# Patient Record
Sex: FEMALE | Race: WHITE | Employment: UNEMPLOYED | URBAN - NONMETROPOLITAN AREA
[De-identification: names, ages, dates, MRNs, and addresses within clinical notes are randomized per-mention and may not be internally consistent; named-entity substitution may affect disease eponyms.]

---

## 2017-11-20 ENCOUNTER — OFFICE VISIT (OUTPATIENT)
Dept: ONCOLOGY | Age: 62
End: 2017-11-20
Payer: COMMERCIAL

## 2017-11-20 ENCOUNTER — HOSPITAL ENCOUNTER (OUTPATIENT)
Dept: INFUSION THERAPY | Age: 62
Discharge: HOME | End: 2017-11-20
Payer: COMMERCIAL

## 2017-11-20 VITALS
DIASTOLIC BLOOD PRESSURE: 67 MMHG | SYSTOLIC BLOOD PRESSURE: 135 MMHG | HEIGHT: 64 IN | BODY MASS INDEX: 50.02 KG/M2 | TEMPERATURE: 97.4 F | RESPIRATION RATE: 18 BRPM | HEART RATE: 82 BPM | OXYGEN SATURATION: 97 % | WEIGHT: 293 LBS

## 2017-11-20 DIAGNOSIS — D72.823 LEUKEMOID REACTION: Primary | ICD-10-CM

## 2017-11-20 DIAGNOSIS — D72.823 LEUKEMOID REACTION: ICD-10-CM

## 2017-11-20 LAB
ALBUMIN SERPL-MCNC: 3.7 G/DL (ref 3.5–5.1)
ALP BLD-CCNC: 291 U/L (ref 38–126)
ALT SERPL-CCNC: 42 U/L (ref 11–66)
ANISOCYTOSIS: ABNORMAL
AST SERPL-CCNC: 39 U/L (ref 5–40)
BASOPHILS # BLD: 0.7 %
BASOPHILS ABSOLUTE: 0.1 THOU/MM3 (ref 0–0.1)
BILIRUB SERPL-MCNC: 0.7 MG/DL (ref 0.3–1.2)
BILIRUBIN DIRECT: < 0.2 MG/DL (ref 0–0.3)
BUN, WHOLE BLOOD: 25 MG/DL (ref 8–26)
CHLORIDE, WHOLE BLOOD: 99 MEQ/L (ref 98–109)
CREATININE, WHOLE BLOOD: 1 MG/DL (ref 0.5–1.2)
EOSINOPHIL # BLD: 3 %
EOSINOPHILS ABSOLUTE: 0.4 THOU/MM3 (ref 0–0.4)
GFR, ESTIMATED ,CON: 60 ML/MIN/1.73M2
GLUCOSE, WHOLE BLOOD: 181 MG/DL (ref 70–108)
HCT VFR BLD CALC: 47.4 % (ref 37–47)
HEMOGLOBIN: 14.8 GM/DL (ref 12–16)
IONIZED CALCIUM, WHOLE BLOOD: 1.29 MMOL/L (ref 1.12–1.32)
LYMPHOCYTES # BLD: 27 %
LYMPHOCYTES ABSOLUTE: 3.8 THOU/MM3 (ref 1–4.8)
MACROCYTES: ABNORMAL
MCH RBC QN AUTO: 31.8 PG (ref 27–31)
MCHC RBC AUTO-ENTMCNC: 31.3 GM/DL (ref 33–37)
MCV RBC AUTO: 102 FL (ref 81–99)
MONOCYTES # BLD: 6.3 %
MONOCYTES ABSOLUTE: 0.9 THOU/MM3 (ref 0.4–1.3)
NUCLEATED RED BLOOD CELLS: 0 /100 WBC
PDW BLD-RTO: 11.4 % (ref 11.5–14.5)
PLATELET # BLD: 378 THOU/MM3 (ref 130–400)
PLATELET ESTIMATE: ADEQUATE
PMV BLD AUTO: 9.2 MCM (ref 7.4–10.4)
POTASSIUM, WHOLE BLOOD: 3.9 MEQ/L (ref 3.5–4.9)
RBC # BLD: 4.67 MILL/MM3 (ref 4.2–5.4)
SCAN OF BLOOD SMEAR: NORMAL
SEG NEUTROPHILS: 63 %
SEGMENTED NEUTROPHILS ABSOLUTE COUNT: 8.9 THOU/MM3 (ref 1.8–7.7)
SODIUM, WHOLE BLOOD: 139 MEQ/L (ref 138–146)
TOTAL CO2, WHOLE BLOOD: 27 MEQ/L (ref 23–33)
TOTAL PROTEIN: 8.5 G/DL (ref 6.1–8)
WBC # BLD: 14.1 THOU/MM3 (ref 4.8–10.8)

## 2017-11-20 PROCEDURE — 3017F COLORECTAL CA SCREEN DOC REV: CPT | Performed by: INTERNAL MEDICINE

## 2017-11-20 PROCEDURE — 84165 PROTEIN E-PHORESIS SERUM: CPT

## 2017-11-20 PROCEDURE — 80076 HEPATIC FUNCTION PANEL: CPT

## 2017-11-20 PROCEDURE — 82784 ASSAY IGA/IGD/IGG/IGM EACH: CPT

## 2017-11-20 PROCEDURE — 88184 FLOWCYTOMETRY/ TC 1 MARKER: CPT

## 2017-11-20 PROCEDURE — 84160 ASSAY OF PROTEIN ANY SOURCE: CPT

## 2017-11-20 PROCEDURE — G8417 CALC BMI ABV UP PARAM F/U: HCPCS | Performed by: INTERNAL MEDICINE

## 2017-11-20 PROCEDURE — 3014F SCREEN MAMMO DOC REV: CPT | Performed by: INTERNAL MEDICINE

## 2017-11-20 PROCEDURE — 85025 COMPLETE CBC W/AUTO DIFF WBC: CPT

## 2017-11-20 PROCEDURE — 83883 ASSAY NEPHELOMETRY NOT SPEC: CPT

## 2017-11-20 PROCEDURE — 36415 COLL VENOUS BLD VENIPUNCTURE: CPT

## 2017-11-20 PROCEDURE — G8484 FLU IMMUNIZE NO ADMIN: HCPCS | Performed by: INTERNAL MEDICINE

## 2017-11-20 PROCEDURE — 86334 IMMUNOFIX E-PHORESIS SERUM: CPT

## 2017-11-20 PROCEDURE — 80047 BASIC METABLC PNL IONIZED CA: CPT

## 2017-11-20 PROCEDURE — 99243 OFF/OP CNSLTJ NEW/EST LOW 30: CPT | Performed by: INTERNAL MEDICINE

## 2017-11-20 PROCEDURE — G8427 DOCREV CUR MEDS BY ELIG CLIN: HCPCS | Performed by: INTERNAL MEDICINE

## 2017-11-20 PROCEDURE — 88185 FLOWCYTOMETRY/TC ADD-ON: CPT

## 2017-11-20 PROCEDURE — 99211 OFF/OP EST MAY X REQ PHY/QHP: CPT

## 2017-11-20 RX ORDER — FUROSEMIDE 40 MG/1
40 TABLET ORAL
COMMUNITY
Start: 2017-10-16

## 2017-11-20 RX ORDER — INSULIN GLARGINE 100 [IU]/ML
INJECTION, SOLUTION SUBCUTANEOUS
Refills: 2 | COMMUNITY
Start: 2017-11-06

## 2017-11-20 RX ORDER — GABAPENTIN 600 MG/1
TABLET ORAL
Refills: 2 | COMMUNITY
Start: 2017-10-31

## 2017-11-20 RX ORDER — NITROGLYCERIN 0.4 MG/1
0.4 TABLET SUBLINGUAL
COMMUNITY
Start: 2017-10-16

## 2017-11-20 RX ORDER — ACETAMINOPHEN 500 MG
1000 TABLET ORAL
COMMUNITY
Start: 2017-10-16

## 2017-11-20 RX ORDER — LEVOTHYROXINE SODIUM 0.12 MG/1
125 TABLET ORAL
COMMUNITY
End: 2019-09-04 | Stop reason: DRUGHIGH

## 2017-11-20 RX ORDER — RIVAROXABAN 20 MG/1
TABLET, FILM COATED ORAL
Refills: 5 | COMMUNITY
Start: 2017-10-11

## 2017-11-20 RX ORDER — SPIRONOLACTONE 25 MG/1
TABLET ORAL
Refills: 2 | COMMUNITY
Start: 2017-11-06

## 2017-11-20 RX ORDER — LOSARTAN POTASSIUM AND HYDROCHLOROTHIAZIDE 25; 100 MG/1; MG/1
TABLET ORAL
COMMUNITY

## 2017-11-20 RX ORDER — METOPROLOL SUCCINATE 50 MG/1
50 TABLET, EXTENDED RELEASE ORAL
COMMUNITY

## 2017-11-23 LAB
IMMUNOFIXATION WITH QUANT: NORMAL
KAPPA/LAMBDA FREE LIGHT CHAINS: NORMAL
LEUK/LYMPH PHENOTYPING WB: NORMAL

## 2017-12-20 ENCOUNTER — HOSPITAL ENCOUNTER (OUTPATIENT)
Dept: INFUSION THERAPY | Age: 62
Discharge: HOME | End: 2017-12-20
Payer: COMMERCIAL

## 2017-12-20 ENCOUNTER — OFFICE VISIT (OUTPATIENT)
Dept: ONCOLOGY | Age: 62
End: 2017-12-20
Payer: COMMERCIAL

## 2017-12-20 VITALS
DIASTOLIC BLOOD PRESSURE: 62 MMHG | HEIGHT: 64 IN | OXYGEN SATURATION: 97 % | RESPIRATION RATE: 18 BRPM | SYSTOLIC BLOOD PRESSURE: 121 MMHG | HEART RATE: 68 BPM | BODY MASS INDEX: 50.02 KG/M2 | TEMPERATURE: 97.9 F | WEIGHT: 293 LBS

## 2017-12-20 DIAGNOSIS — D47.2 MGUS (MONOCLONAL GAMMOPATHY OF UNKNOWN SIGNIFICANCE): Primary | ICD-10-CM

## 2017-12-20 PROCEDURE — G8484 FLU IMMUNIZE NO ADMIN: HCPCS | Performed by: INTERNAL MEDICINE

## 2017-12-20 PROCEDURE — 1036F TOBACCO NON-USER: CPT | Performed by: INTERNAL MEDICINE

## 2017-12-20 PROCEDURE — 99213 OFFICE O/P EST LOW 20 MIN: CPT | Performed by: INTERNAL MEDICINE

## 2017-12-20 PROCEDURE — G8417 CALC BMI ABV UP PARAM F/U: HCPCS | Performed by: INTERNAL MEDICINE

## 2017-12-20 PROCEDURE — 3017F COLORECTAL CA SCREEN DOC REV: CPT | Performed by: INTERNAL MEDICINE

## 2017-12-20 PROCEDURE — G8427 DOCREV CUR MEDS BY ELIG CLIN: HCPCS | Performed by: INTERNAL MEDICINE

## 2017-12-20 PROCEDURE — 99211 OFF/OP EST MAY X REQ PHY/QHP: CPT

## 2017-12-20 PROCEDURE — 3014F SCREEN MAMMO DOC REV: CPT | Performed by: INTERNAL MEDICINE

## 2017-12-29 NOTE — PROGRESS NOTES
[facility] CENTER  CANCER NETWORK OF [facility]  ONCOLOGY SPECIALISTS OF [facility] [address] BLAKELY [facility] 98  [address], [facility] [address]  [address]  Dept: [phone]  Dept Fax: [phone]  Loc: [phone]    Dr. Audie Ardon,    Thank you for your referral of this patient for evaluation of an elevated white blood cell count. I appreciate your  trust of my care for your patients. I know that you know this patient's history well, but I will summarize for my records. In addition, my recommendations and plan of care are summarized below. Please call if you have any questions or if I can be of any further assistance to you or this patient. Subjective:      Chief Complaint:  Sony Bueno is a 58 y.o. female with leukocytosis. HPI: This is the first visit to the [facility] for this patient who was referred by Dr. Audie Ardon for evaluation of an elevated WBC count. The patient has noted to have leukocytosis. This was first identified on October 16, 2017. Leukocytosis is noted to be mild with a total white blood cell count of 20.1 on that date. A follow-up CBC on October 26, 2017 found a continued mild elevation of her total white blood cell count. On that date for lipid cell count was 12.7. In context of this condition, the patient has had her spleen surgically removed. This was removed for a mass within the spleen was identified to be benign. This is a likely contributing factor to her elevated total white blood cell count. The patient has no associated signs or symptoms of hematological disease. She has not had fever or other recent signs of infection. In addition, the patient generally feels well with no physical or inflammatory type of complaints. She has not been on any recent sterilely therapy. The patient denies shortness of breath, chest pain, a change in bowel habits or a change in bladder habits. ECOG performance status is level 0. Past Medical History  She has a past medical history of Abnormal heart rhythm; Bleeds easily ([address] Hypertension; Thyroid disease; and Type 2 diabetes mellitus without complication ([facility] [address] Surgical History  She has a past surgical history that includes Pancreatectomy; Splenectomy (06/2011); Cholecystectomy; Carpal tunnel release (Bilateral, 1997); and Atrial cardiac pacemaker insertion (06/2012). Home Medications  She has a current medication list which includes the following prescription(s): furosemide, novolog, insulin aspart, basaglar kwikpen, levothyroxine, metoprolol succinate, nitroglycerin, xarelto, spironolactone, losartan-hydrochlorothiazide, acetaminophen, and gabapentin. Allergies  Allergies   Allergen Reactions    Penicillins Hives    Statins Nausea And Vomiting    Sulfa Antibiotics Nausea And Vomiting and Other (See Comments)     Cramps     Social History  She reports that she has never smoked. She has never used smokeless tobacco. She reports that she does not drink alcohol or use drugs. Family History  Her family history includes Cancer in her mother; Cirrhosis in her mother; Colon Cancer in her sister; Diabetes in her father and mother; Heart Attack in her father; High Blood Pressure in her father and mother; Thyroid Disease in her mother. Review of Systems  Constitutional: Fatigue, weight gain. HENT: Hearing loss. Eyes: Negative. Respiratory: Negative. Cardiovascular: Negative. Gastrointestinal: Trouble swallowing, nausea. Genitourinary: Frequency. Musculoskeletal: Joint pain. Skin: Itching. Neurological: Head aches, dizziness. Hematological: Negative. Psychiatric/Behavioral: Anxiety, depression. Objective:   Physical Exam  Vitals:    11/20/17 1423   BP: 135/67   Pulse: 82   Resp: 18   Temp: 97.4 °F (36.3 °C)   SpO2: 97%   Vitals reviewed and are stable. Constitutional: Well-developed and well-nourished. No acute distress.    HENT: Normocephalic and atraumatic. Eyes: Pupils are equal and reactive. No scleral icterus. Neck: Overall appearance is symmetrical. No identifiable masses. Chest: Inspection and palpation of chest is normal.  Pulmonary: Effort normal. No respiratory distress. Cardiovascular: RRR. No edema in any of the four extremities. Abdominal: Soft. No hepatomegaly or splenomegaly. Musculoskeletal: Gait is normal. Muscle strength and tone good. Neurological: Alert and oriented to person, place, and time. Judgment and thought content normal.  Skin: Skin is warm and dry. No rash. Psychiatric: Mood and affect appropriate for the clinical situation. Behavior is normal.        Assessment:   1. Leukocytosis. 2.  Asplenia. Recommendations:   1. Check the following laboratory studies today:   CBC Auto Differential    POC PANEL BMP W/IOCA    Hepatic Function Panel    Sedimentation Rate    [place]/Lambda Free Lt Chains, Serum Quant    Flow cytometry leukemia/lymphoma blood    Immunofixation w/ Quant   2. Monitor total WBC count and for signs of infection/fever.     Magui Bowman [address]oo [facility], [address] [place] [address], [address] [place] [address], [address] [place] [address]

## 2018-02-19 ENCOUNTER — HOSPITAL ENCOUNTER (OUTPATIENT)
Age: 63
Discharge: HOME | End: 2018-02-19
Payer: COMMERCIAL

## 2018-02-19 DIAGNOSIS — D47.2 MGUS (MONOCLONAL GAMMOPATHY OF UNKNOWN SIGNIFICANCE): ICD-10-CM

## 2018-02-19 LAB
ALBUMIN SERPL-MCNC: 3.6 G/DL (ref 3.5–5.1)
ALP BLD-CCNC: 295 U/L (ref 38–126)
ALT SERPL-CCNC: 43 U/L (ref 11–66)
ANISOCYTOSIS: NORMAL
AST SERPL-CCNC: 40 U/L (ref 5–40)
BASOPHILS # BLD: 0.6 %
BASOPHILS ABSOLUTE: 0.1 THOU/MM3 (ref 0–0.1)
BILIRUB SERPL-MCNC: 0.5 MG/DL (ref 0.3–1.2)
BILIRUBIN DIRECT: < 0.2 MG/DL (ref 0–0.3)
BUN, WHOLE BLOOD: 31 MG/DL (ref 8–26)
CALCIUM SERPL-MCNC: 9.9 MG/DL (ref 8.5–10.5)
CHLORIDE, WHOLE BLOOD: 98 MEQ/L (ref 98–109)
CREATININE, WHOLE BLOOD: 1.3 MG/DL (ref 0.5–1.2)
EOSINOPHIL # BLD: 2.8 %
EOSINOPHILS ABSOLUTE: 0.3 THOU/MM3 (ref 0–0.4)
GFR, ESTIMATED ,CON: 44 ML/MIN/1.73M2
GLUCOSE, WHOLE BLOOD: 364 MG/DL (ref 70–108)
HCT VFR BLD CALC: 43.6 % (ref 37–47)
HEMOGLOBIN: 14.5 GM/DL (ref 12–16)
IONIZED CALCIUM, WHOLE BLOOD: 1.19 MMOL/L (ref 1.12–1.32)
LYMPHOCYTES # BLD: 32.2 %
LYMPHOCYTES ABSOLUTE: 3.4 THOU/MM3 (ref 1–4.8)
MACROCYTES: NORMAL
MCH RBC QN AUTO: 32.9 PG (ref 27–31)
MCHC RBC AUTO-ENTMCNC: 33.4 GM/DL (ref 33–37)
MCV RBC AUTO: 99 FL (ref 81–99)
MONOCYTES # BLD: 6.8 %
MONOCYTES ABSOLUTE: 0.7 THOU/MM3 (ref 0.4–1.3)
NUCLEATED RED BLOOD CELLS: 7 /100 WBC
PDW BLD-RTO: 11.9 % (ref 11.5–14.5)
PLATELET # BLD: 303 THOU/MM3 (ref 130–400)
PMV BLD AUTO: 9 FL (ref 7.4–10.4)
POTASSIUM, WHOLE BLOOD: 4.6 MEQ/L (ref 3.5–4.9)
RBC # BLD: 4.42 MILL/MM3 (ref 4.2–5.4)
SEG NEUTROPHILS: 57.6 %
SEGMENTED NEUTROPHILS ABSOLUTE COUNT: 6.1 THOU/MM3 (ref 1.8–7.7)
SODIUM, WHOLE BLOOD: 136 MEQ/L (ref 138–146)
TOTAL CO2, WHOLE BLOOD: 29 MEQ/L (ref 23–33)
TOTAL PROTEIN: 8.3 G/DL (ref 6.1–8)
WBC # BLD: 10.6 THOU/MM3 (ref 4.8–10.8)

## 2018-02-19 PROCEDURE — 84160 ASSAY OF PROTEIN ANY SOURCE: CPT

## 2018-02-19 PROCEDURE — 84165 PROTEIN E-PHORESIS SERUM: CPT

## 2018-02-19 PROCEDURE — 83883 ASSAY NEPHELOMETRY NOT SPEC: CPT

## 2018-02-19 PROCEDURE — 80047 BASIC METABLC PNL IONIZED CA: CPT

## 2018-02-19 PROCEDURE — 82784 ASSAY IGA/IGD/IGG/IGM EACH: CPT

## 2018-02-19 PROCEDURE — 36415 COLL VENOUS BLD VENIPUNCTURE: CPT

## 2018-02-19 PROCEDURE — 82310 ASSAY OF CALCIUM: CPT

## 2018-02-19 PROCEDURE — 80076 HEPATIC FUNCTION PANEL: CPT

## 2018-02-19 PROCEDURE — 86334 IMMUNOFIX E-PHORESIS SERUM: CPT

## 2018-02-19 PROCEDURE — 85025 COMPLETE CBC W/AUTO DIFF WBC: CPT

## 2018-02-21 LAB
IMMUNOFIXATION WITH QUANT: NORMAL
KAPPA/LAMBDA FREE LIGHT CHAINS: NORMAL

## 2018-02-28 ENCOUNTER — HOSPITAL ENCOUNTER (OUTPATIENT)
Dept: INFUSION THERAPY | Age: 63
Discharge: HOME | End: 2018-02-28
Payer: COMMERCIAL

## 2018-02-28 ENCOUNTER — OFFICE VISIT (OUTPATIENT)
Dept: ONCOLOGY | Age: 63
End: 2018-02-28
Payer: COMMERCIAL

## 2018-02-28 VITALS
SYSTOLIC BLOOD PRESSURE: 129 MMHG | HEIGHT: 64 IN | RESPIRATION RATE: 18 BRPM | TEMPERATURE: 98.2 F | OXYGEN SATURATION: 99 % | DIASTOLIC BLOOD PRESSURE: 70 MMHG | WEIGHT: 293 LBS | BODY MASS INDEX: 50.02 KG/M2 | HEART RATE: 81 BPM

## 2018-02-28 DIAGNOSIS — D47.2 MGUS (MONOCLONAL GAMMOPATHY OF UNKNOWN SIGNIFICANCE): ICD-10-CM

## 2018-02-28 DIAGNOSIS — Z86.2 HISTORY OF LEUKOCYTOSIS: ICD-10-CM

## 2018-02-28 DIAGNOSIS — Q89.01 ASPLENIA: Primary | ICD-10-CM

## 2018-02-28 PROCEDURE — G8417 CALC BMI ABV UP PARAM F/U: HCPCS | Performed by: INTERNAL MEDICINE

## 2018-02-28 PROCEDURE — 3017F COLORECTAL CA SCREEN DOC REV: CPT | Performed by: INTERNAL MEDICINE

## 2018-02-28 PROCEDURE — G8427 DOCREV CUR MEDS BY ELIG CLIN: HCPCS | Performed by: INTERNAL MEDICINE

## 2018-02-28 PROCEDURE — 99214 OFFICE O/P EST MOD 30 MIN: CPT | Performed by: INTERNAL MEDICINE

## 2018-02-28 PROCEDURE — 3014F SCREEN MAMMO DOC REV: CPT | Performed by: INTERNAL MEDICINE

## 2018-02-28 PROCEDURE — 1036F TOBACCO NON-USER: CPT | Performed by: INTERNAL MEDICINE

## 2018-02-28 PROCEDURE — G8484 FLU IMMUNIZE NO ADMIN: HCPCS | Performed by: INTERNAL MEDICINE

## 2018-02-28 PROCEDURE — 99211 OFF/OP EST MAY X REQ PHY/QHP: CPT

## 2018-06-26 ENCOUNTER — HOSPITAL ENCOUNTER (OUTPATIENT)
Age: 63
Discharge: HOME | End: 2018-06-26
Payer: COMMERCIAL

## 2018-06-26 ENCOUNTER — OFFICE VISIT (OUTPATIENT)
Dept: INTERNAL MEDICINE CLINIC | Age: 63
End: 2018-06-26
Payer: COMMERCIAL

## 2018-06-26 VITALS
WEIGHT: 293 LBS | HEIGHT: 63 IN | SYSTOLIC BLOOD PRESSURE: 128 MMHG | HEART RATE: 72 BPM | OXYGEN SATURATION: 98 % | DIASTOLIC BLOOD PRESSURE: 80 MMHG | BODY MASS INDEX: 51.91 KG/M2

## 2018-06-26 DIAGNOSIS — E66.01 MORBID OBESITY WITH BMI OF 50.0-59.9, ADULT (HCC): ICD-10-CM

## 2018-06-26 DIAGNOSIS — E11.9 TYPE 2 DIABETES MELLITUS WITHOUT COMPLICATION, WITH LONG-TERM CURRENT USE OF INSULIN (HCC): Primary | ICD-10-CM

## 2018-06-26 DIAGNOSIS — Z79.4 TYPE 2 DIABETES MELLITUS WITHOUT COMPLICATION, WITH LONG-TERM CURRENT USE OF INSULIN (HCC): ICD-10-CM

## 2018-06-26 DIAGNOSIS — D72.829 LEUKOCYTOSIS, UNSPECIFIED TYPE: ICD-10-CM

## 2018-06-26 DIAGNOSIS — Z79.4 TYPE 2 DIABETES MELLITUS WITHOUT COMPLICATION, WITH LONG-TERM CURRENT USE OF INSULIN (HCC): Primary | ICD-10-CM

## 2018-06-26 DIAGNOSIS — Z90.81 H/O SPLENECTOMY: ICD-10-CM

## 2018-06-26 DIAGNOSIS — E11.9 TYPE 2 DIABETES MELLITUS WITHOUT COMPLICATION, WITH LONG-TERM CURRENT USE OF INSULIN (HCC): ICD-10-CM

## 2018-06-26 PROCEDURE — 2022F DILAT RTA XM EVC RTNOPTHY: CPT | Performed by: INTERNAL MEDICINE

## 2018-06-26 PROCEDURE — 1036F TOBACCO NON-USER: CPT | Performed by: INTERNAL MEDICINE

## 2018-06-26 PROCEDURE — 3046F HEMOGLOBIN A1C LEVEL >9.0%: CPT | Performed by: INTERNAL MEDICINE

## 2018-06-26 PROCEDURE — 3017F COLORECTAL CA SCREEN DOC REV: CPT | Performed by: INTERNAL MEDICINE

## 2018-06-26 PROCEDURE — 84681 ASSAY OF C-PEPTIDE: CPT

## 2018-06-26 PROCEDURE — G8427 DOCREV CUR MEDS BY ELIG CLIN: HCPCS | Performed by: INTERNAL MEDICINE

## 2018-06-26 PROCEDURE — 36415 COLL VENOUS BLD VENIPUNCTURE: CPT

## 2018-06-26 PROCEDURE — G8417 CALC BMI ABV UP PARAM F/U: HCPCS | Performed by: INTERNAL MEDICINE

## 2018-06-26 PROCEDURE — 99204 OFFICE O/P NEW MOD 45 MIN: CPT | Performed by: INTERNAL MEDICINE

## 2018-06-26 ASSESSMENT — PATIENT HEALTH QUESTIONNAIRE - PHQ9
1. LITTLE INTEREST OR PLEASURE IN DOING THINGS: 0
SUM OF ALL RESPONSES TO PHQ QUESTIONS 1-9: 0
SUM OF ALL RESPONSES TO PHQ9 QUESTIONS 1 & 2: 0
2. FEELING DOWN, DEPRESSED OR HOPELESS: 0

## 2018-06-26 NOTE — PROGRESS NOTES
is 18-25  Achieving ideal BMI reduces cardiovascular risk as well as helping decrease some of the complications of diabetes. We'll check a C-peptide. She is going to track her sugars.   I asked her to consider bariatric surgery   Follow-up here 6 weeks

## 2018-06-26 NOTE — LETTER
[address] [address]  [address]  [address] [address]  Phone: [phone]  Fax: [phone]    Olman Bourne MD        June 26, 2018     [facility] [address] [facility]      Dear Aisha Pearson: We are sorry that you missed your appointment with DR Yue Jennings on 6/26/2018. Your health and follow-up medical care are important to us. Please call our office as soon as possible so that we may reschedule your appointment. If you have already rescheduled your appointment, please disregard this letter.     Sincerely,        Olman Bourne MD

## 2018-06-27 LAB — C-PEPTIDE: 0.4 NG/ML (ref 1.1–4.4)

## 2018-08-22 ENCOUNTER — OFFICE VISIT (OUTPATIENT)
Dept: ONCOLOGY | Age: 63
End: 2018-08-22
Payer: COMMERCIAL

## 2018-08-22 ENCOUNTER — HOSPITAL ENCOUNTER (OUTPATIENT)
Dept: INFUSION THERAPY | Age: 63
Discharge: HOME | End: 2018-08-22
Payer: COMMERCIAL

## 2018-08-22 VITALS
DIASTOLIC BLOOD PRESSURE: 72 MMHG | WEIGHT: 293 LBS | TEMPERATURE: 98.3 F | OXYGEN SATURATION: 97 % | RESPIRATION RATE: 18 BRPM | HEIGHT: 63 IN | BODY MASS INDEX: 51.91 KG/M2 | HEART RATE: 77 BPM | SYSTOLIC BLOOD PRESSURE: 136 MMHG

## 2018-08-22 DIAGNOSIS — D47.2 MGUS (MONOCLONAL GAMMOPATHY OF UNKNOWN SIGNIFICANCE): Primary | ICD-10-CM

## 2018-08-22 DIAGNOSIS — D47.2 MGUS (MONOCLONAL GAMMOPATHY OF UNKNOWN SIGNIFICANCE): ICD-10-CM

## 2018-08-22 PROCEDURE — 99213 OFFICE O/P EST LOW 20 MIN: CPT | Performed by: INTERNAL MEDICINE

## 2018-08-22 PROCEDURE — 84165 PROTEIN E-PHORESIS SERUM: CPT

## 2018-08-22 PROCEDURE — 99211 OFF/OP EST MAY X REQ PHY/QHP: CPT

## 2018-08-22 PROCEDURE — 83883 ASSAY NEPHELOMETRY NOT SPEC: CPT

## 2018-08-22 PROCEDURE — 84160 ASSAY OF PROTEIN ANY SOURCE: CPT

## 2018-08-22 PROCEDURE — 3017F COLORECTAL CA SCREEN DOC REV: CPT | Performed by: INTERNAL MEDICINE

## 2018-08-22 PROCEDURE — G8417 CALC BMI ABV UP PARAM F/U: HCPCS | Performed by: INTERNAL MEDICINE

## 2018-08-22 PROCEDURE — 86334 IMMUNOFIX E-PHORESIS SERUM: CPT

## 2018-08-22 PROCEDURE — 36415 COLL VENOUS BLD VENIPUNCTURE: CPT

## 2018-08-22 PROCEDURE — 82784 ASSAY IGA/IGD/IGG/IGM EACH: CPT

## 2018-08-22 PROCEDURE — G8427 DOCREV CUR MEDS BY ELIG CLIN: HCPCS | Performed by: INTERNAL MEDICINE

## 2018-08-22 PROCEDURE — 1036F TOBACCO NON-USER: CPT | Performed by: INTERNAL MEDICINE

## 2018-08-22 RX ORDER — FLUCONAZOLE 100 MG/1
100 TABLET ORAL DAILY
COMMUNITY
Start: 2018-08-20 | End: 2018-08-30

## 2018-08-25 LAB — KAPPA/LAMBDA FREE LIGHT CHAINS: NORMAL

## 2018-08-26 LAB — IMMUNOFIXATION WITH QUANT: NORMAL

## 2018-08-30 NOTE — PROGRESS NOTES
[facility] CANCER CENTER  CANCER NETWORK OF [facility]  [facility] [address] [facility] [address]  [address], [facility] [address]  [address]  Dept: [phone]  Dept Fax: [phone]  Loc: [phone]    Subjective:      Chief Complaint:  Oseas Gold is a 58 y.o. female with hematological disease. HPI:  The patient is here today for follow up evaluation. The patient is here today for a follow-up regarding monoclonal gammopathy of undetermined significance. Her overall health remains stable. She has no specific complaints that would be suggestive of progression of her hematological disease. The patient denies skeletal pain and her kidney function has remained normal.  She has not had severe or frequent infections. The patient denies shortness of breath, chest pain, a change in bowel habits or a change in bladder habits. ECOG performance status is level 0. PMH, SH, and FH:  I reviewed the patients medication list and allergy list as noted on the electronic medical record. The PMH, SH and FH were also reviewed as noted on the EMR. Review of Systems  Constitutional: Negative. HENT: Negative. Eyes: Negative. Respiratory: Negative. Cardiovascular: Negative. Gastrointestinal: Negative. Genitourinary: Negative. Musculoskeletal: Negative. Skin: Negative. Neurological: Negative. Hematological: Negative. Psychiatric/Behavioral: Negative. Objective:   Physical Exam  Vitals:    08/22/18 1500   BP: 136/72   Pulse: 77   Resp: 18   Temp: 98.3 °F (36.8 °C)   SpO2: 97%   Vitals reviewed and are stable. Constitutional: Well-developed and well-nourished. No acute distress. HENT: Normocephalic and atraumatic. Eyes: Pupils are equal and reactive. No scleral icterus. Neck: Overall appearance is symmetrical. No identifiable masses.   Chest: Inspection and palpation of chest is normal.  Pulmonary: Effort normal. No respiratory distress. Cardiovascular: RRR. No edema in any of the four extremities. Abdominal: Soft. No hepatomegaly or splenomegaly. Musculoskeletal: Gait is normal. Muscle strength and tone good. Neurological: Alert and oriented to person, place, and time. Judgment and thought content normal.  Skin: Skin is warm and dry. No rash. Psychiatric: Mood and affect appropriate for the clinical situation. Behavior is normal.        Data Analysis:    SPEP/MOON Interpretation                     M-spike in the beta/gamma region.  The monoclonal protein   peak accounts for 1.15 g/dL of the total protein in the   beta and gamma regions.  This quantitation may include   complement components.  MOON pattern shows an IgA type   lambda monoclonal protein. Assessment:   1. Monoclonal gammopathy of undetermined significance. 2.  Asplenia. Recommendations:   1. Monitor monoclonal protein.     Brigette Nation M.D.  [address] [facility], [address] [place] [address], [address] [place] [address], [address] [place] [address]

## 2019-02-13 ENCOUNTER — HOSPITAL ENCOUNTER (OUTPATIENT)
Age: 64
Discharge: HOME | End: 2019-02-13
Payer: COMMERCIAL

## 2019-02-13 DIAGNOSIS — D47.2 MGUS (MONOCLONAL GAMMOPATHY OF UNKNOWN SIGNIFICANCE): ICD-10-CM

## 2019-02-13 LAB
ANION GAP SERPL CALCULATED.3IONS-SCNC: 15 MEQ/L (ref 8–16)
BASOPHILS # BLD: 1.1 %
BASOPHILS ABSOLUTE: 0.1 THOU/MM3 (ref 0–0.1)
BUN BLDV-MCNC: 27 MG/DL (ref 7–22)
CALCIUM SERPL-MCNC: 9.7 MG/DL (ref 8.5–10.5)
CHLORIDE BLD-SCNC: 95 MEQ/L (ref 98–111)
CO2: 27 MEQ/L (ref 23–33)
CREAT SERPL-MCNC: 1.2 MG/DL (ref 0.4–1.2)
EOSINOPHIL # BLD: 2.3 %
EOSINOPHILS ABSOLUTE: 0.3 THOU/MM3 (ref 0–0.4)
ERYTHROCYTE [DISTWIDTH] IN BLOOD BY AUTOMATED COUNT: 14.6 % (ref 11.5–14.5)
ERYTHROCYTE [DISTWIDTH] IN BLOOD BY AUTOMATED COUNT: 53.1 FL (ref 35–45)
GFR SERPL CREATININE-BSD FRML MDRD: 45 ML/MIN/1.73M2
GLUCOSE BLD-MCNC: 209 MG/DL (ref 70–108)
HCT VFR BLD CALC: 41.3 % (ref 37–47)
HEMOGLOBIN: 13.3 GM/DL (ref 12–16)
IMMATURE GRANS (ABS): 0.05 THOU/MM3 (ref 0–0.07)
IMMATURE GRANULOCYTES: 0.4 %
LYMPHOCYTES # BLD: 36.7 %
LYMPHOCYTES ABSOLUTE: 4.9 THOU/MM3 (ref 1–4.8)
MCH RBC QN AUTO: 32 PG (ref 26–33)
MCHC RBC AUTO-ENTMCNC: 32.2 GM/DL (ref 32.2–35.5)
MCV RBC AUTO: 99.3 FL (ref 81–99)
MONOCYTES # BLD: 6.4 %
MONOCYTES ABSOLUTE: 0.9 THOU/MM3 (ref 0.4–1.3)
NUCLEATED RED BLOOD CELLS: 0 /100 WBC
PLATELET # BLD: 408 THOU/MM3 (ref 130–400)
PMV BLD AUTO: 11.3 FL (ref 9.4–12.4)
POTASSIUM SERPL-SCNC: 4 MEQ/L (ref 3.5–5.2)
RBC # BLD: 4.16 MILL/MM3 (ref 4.2–5.4)
SEG NEUTROPHILS: 53.1 %
SEGMENTED NEUTROPHILS ABSOLUTE COUNT: 7.1 THOU/MM3 (ref 1.8–7.7)
SODIUM BLD-SCNC: 137 MEQ/L (ref 135–145)
WBC # BLD: 13.3 THOU/MM3 (ref 4.8–10.8)

## 2019-02-13 PROCEDURE — 80048 BASIC METABOLIC PNL TOTAL CA: CPT

## 2019-02-13 PROCEDURE — 85025 COMPLETE CBC W/AUTO DIFF WBC: CPT

## 2019-02-13 PROCEDURE — 84160 ASSAY OF PROTEIN ANY SOURCE: CPT

## 2019-02-13 PROCEDURE — 83883 ASSAY NEPHELOMETRY NOT SPEC: CPT

## 2019-02-13 PROCEDURE — 86334 IMMUNOFIX E-PHORESIS SERUM: CPT

## 2019-02-13 PROCEDURE — 36415 COLL VENOUS BLD VENIPUNCTURE: CPT

## 2019-02-13 PROCEDURE — 84165 PROTEIN E-PHORESIS SERUM: CPT

## 2019-02-13 PROCEDURE — 82784 ASSAY IGA/IGD/IGG/IGM EACH: CPT

## 2019-02-17 LAB — KAPPA/LAMBDA FREE LIGHT CHAINS: NORMAL

## 2019-02-18 LAB — IMMUNOFIXATION WITH QUANT: NORMAL

## 2019-02-27 ENCOUNTER — OFFICE VISIT (OUTPATIENT)
Dept: ONCOLOGY | Age: 64
End: 2019-02-27
Payer: COMMERCIAL

## 2019-02-27 ENCOUNTER — HOSPITAL ENCOUNTER (OUTPATIENT)
Dept: INFUSION THERAPY | Age: 64
Discharge: HOME | End: 2019-02-27
Payer: COMMERCIAL

## 2019-02-27 VITALS
HEART RATE: 83 BPM | SYSTOLIC BLOOD PRESSURE: 140 MMHG | WEIGHT: 293 LBS | OXYGEN SATURATION: 98 % | HEIGHT: 63 IN | BODY MASS INDEX: 51.91 KG/M2 | DIASTOLIC BLOOD PRESSURE: 64 MMHG | RESPIRATION RATE: 18 BRPM | TEMPERATURE: 97.9 F

## 2019-02-27 DIAGNOSIS — Z90.81 THROMBOCYTOSIS AFTER SPLENECTOMY: ICD-10-CM

## 2019-02-27 DIAGNOSIS — D72.829 LEUKOCYTOSIS, UNSPECIFIED TYPE: ICD-10-CM

## 2019-02-27 DIAGNOSIS — D75.838 THROMBOCYTOSIS AFTER SPLENECTOMY: ICD-10-CM

## 2019-02-27 DIAGNOSIS — D47.2 MGUS (MONOCLONAL GAMMOPATHY OF UNKNOWN SIGNIFICANCE): Primary | ICD-10-CM

## 2019-02-27 PROCEDURE — G8417 CALC BMI ABV UP PARAM F/U: HCPCS | Performed by: INTERNAL MEDICINE

## 2019-02-27 PROCEDURE — G8427 DOCREV CUR MEDS BY ELIG CLIN: HCPCS | Performed by: INTERNAL MEDICINE

## 2019-02-27 PROCEDURE — G8484 FLU IMMUNIZE NO ADMIN: HCPCS | Performed by: INTERNAL MEDICINE

## 2019-02-27 PROCEDURE — 99214 OFFICE O/P EST MOD 30 MIN: CPT | Performed by: INTERNAL MEDICINE

## 2019-02-27 PROCEDURE — 1036F TOBACCO NON-USER: CPT | Performed by: INTERNAL MEDICINE

## 2019-02-27 PROCEDURE — 99211 OFF/OP EST MAY X REQ PHY/QHP: CPT

## 2019-02-27 PROCEDURE — 3017F COLORECTAL CA SCREEN DOC REV: CPT | Performed by: INTERNAL MEDICINE

## 2019-08-19 ENCOUNTER — HOSPITAL ENCOUNTER (OUTPATIENT)
Age: 64
Discharge: HOME | End: 2019-08-19
Payer: MEDICARE

## 2019-08-19 DIAGNOSIS — Q89.01 ASPLENIA: ICD-10-CM

## 2019-08-19 DIAGNOSIS — Z86.2 HISTORY OF LEUKOCYTOSIS: ICD-10-CM

## 2019-08-19 DIAGNOSIS — D47.2 MGUS (MONOCLONAL GAMMOPATHY OF UNKNOWN SIGNIFICANCE): ICD-10-CM

## 2019-08-19 LAB
ALBUMIN SERPL-MCNC: 3.5 G/DL (ref 3.5–5.1)
ALP BLD-CCNC: 228 U/L (ref 38–126)
ALT SERPL-CCNC: 27 U/L (ref 11–66)
ANION GAP SERPL CALCULATED.3IONS-SCNC: 14 MEQ/L (ref 8–16)
AST SERPL-CCNC: 44 U/L (ref 5–40)
BASOPHILS # BLD: 0.8 %
BASOPHILS ABSOLUTE: 0.1 THOU/MM3 (ref 0–0.1)
BILIRUB SERPL-MCNC: 0.6 MG/DL (ref 0.3–1.2)
BUN BLDV-MCNC: 32 MG/DL (ref 7–22)
CALCIUM SERPL-MCNC: 10.4 MG/DL (ref 8.5–10.5)
CHLORIDE BLD-SCNC: 98 MEQ/L (ref 98–111)
CO2: 27 MEQ/L (ref 23–33)
CREAT SERPL-MCNC: 1.2 MG/DL (ref 0.4–1.2)
EOSINOPHIL # BLD: 2.8 %
EOSINOPHILS ABSOLUTE: 0.4 THOU/MM3 (ref 0–0.4)
ERYTHROCYTE [DISTWIDTH] IN BLOOD BY AUTOMATED COUNT: 15.6 % (ref 11.5–14.5)
ERYTHROCYTE [DISTWIDTH] IN BLOOD BY AUTOMATED COUNT: 59.4 FL (ref 35–45)
GFR SERPL CREATININE-BSD FRML MDRD: 45 ML/MIN/1.73M2
GLUCOSE BLD-MCNC: 178 MG/DL (ref 70–108)
HCT VFR BLD CALC: 43.2 % (ref 37–47)
HEMOGLOBIN: 13.4 GM/DL (ref 12–16)
IMMATURE GRANS (ABS): 0.07 THOU/MM3 (ref 0–0.07)
IMMATURE GRANULOCYTES: 0 %
LYMPHOCYTES # BLD: 34.1 %
LYMPHOCYTES ABSOLUTE: 5.3 THOU/MM3 (ref 1–4.8)
MCH RBC QN AUTO: 32.4 PG (ref 26–33)
MCHC RBC AUTO-ENTMCNC: 31 GM/DL (ref 32.2–35.5)
MCV RBC AUTO: 104.3 FL (ref 81–99)
MONOCYTES # BLD: 6.7 %
MONOCYTES ABSOLUTE: 1 THOU/MM3 (ref 0.4–1.3)
NUCLEATED RED BLOOD CELLS: 0 /100 WBC
PLATELET # BLD: 324 THOU/MM3 (ref 130–400)
PLATELET ESTIMATE: ADEQUATE
PMV BLD AUTO: 12.3 FL (ref 9.4–12.4)
POTASSIUM SERPL-SCNC: 4.4 MEQ/L (ref 3.5–5.2)
RBC # BLD: 4.14 MILL/MM3 (ref 4.2–5.4)
ROULEAUX: SLIGHT
SCAN OF BLOOD SMEAR: NORMAL
SEG NEUTROPHILS: 55.2 %
SEGMENTED NEUTROPHILS ABSOLUTE COUNT: 8.6 THOU/MM3 (ref 1.8–7.7)
SODIUM BLD-SCNC: 139 MEQ/L (ref 135–145)
TOTAL PROTEIN: 8.2 G/DL (ref 6.1–8)
WBC # BLD: 15.6 THOU/MM3 (ref 4.8–10.8)

## 2019-08-19 PROCEDURE — 86334 IMMUNOFIX E-PHORESIS SERUM: CPT

## 2019-08-19 PROCEDURE — 80053 COMPREHEN METABOLIC PANEL: CPT

## 2019-08-19 PROCEDURE — 36415 COLL VENOUS BLD VENIPUNCTURE: CPT

## 2019-08-19 PROCEDURE — 84160 ASSAY OF PROTEIN ANY SOURCE: CPT

## 2019-08-19 PROCEDURE — 82784 ASSAY IGA/IGD/IGG/IGM EACH: CPT

## 2019-08-19 PROCEDURE — 84165 PROTEIN E-PHORESIS SERUM: CPT

## 2019-08-19 PROCEDURE — 83883 ASSAY NEPHELOMETRY NOT SPEC: CPT

## 2019-08-19 PROCEDURE — 85025 COMPLETE CBC W/AUTO DIFF WBC: CPT

## 2019-08-23 LAB — IMMUNOFIXATION WITH QUANT: NORMAL

## 2019-08-24 LAB — KAPPA/LAMBDA FREE LIGHT CHAINS: NORMAL

## 2019-09-04 ENCOUNTER — OFFICE VISIT (OUTPATIENT)
Dept: ONCOLOGY | Age: 64
End: 2019-09-04
Payer: MEDICARE

## 2019-09-04 ENCOUNTER — HOSPITAL ENCOUNTER (OUTPATIENT)
Dept: INFUSION THERAPY | Age: 64
Discharge: HOME | End: 2019-09-04
Payer: MEDICARE

## 2019-09-04 VITALS
BODY MASS INDEX: 51.91 KG/M2 | TEMPERATURE: 98.3 F | WEIGHT: 293 LBS | DIASTOLIC BLOOD PRESSURE: 65 MMHG | RESPIRATION RATE: 18 BRPM | OXYGEN SATURATION: 97 % | SYSTOLIC BLOOD PRESSURE: 151 MMHG | HEIGHT: 63 IN | HEART RATE: 78 BPM

## 2019-09-04 DIAGNOSIS — D72.829 LEUKOCYTOSIS, UNSPECIFIED TYPE: ICD-10-CM

## 2019-09-04 DIAGNOSIS — D47.2 MGUS (MONOCLONAL GAMMOPATHY OF UNKNOWN SIGNIFICANCE): Primary | ICD-10-CM

## 2019-09-04 PROCEDURE — 1036F TOBACCO NON-USER: CPT | Performed by: INTERNAL MEDICINE

## 2019-09-04 PROCEDURE — 99214 OFFICE O/P EST MOD 30 MIN: CPT | Performed by: INTERNAL MEDICINE

## 2019-09-04 PROCEDURE — G8417 CALC BMI ABV UP PARAM F/U: HCPCS | Performed by: INTERNAL MEDICINE

## 2019-09-04 PROCEDURE — 99211 OFF/OP EST MAY X REQ PHY/QHP: CPT

## 2019-09-04 PROCEDURE — G8427 DOCREV CUR MEDS BY ELIG CLIN: HCPCS | Performed by: INTERNAL MEDICINE

## 2019-09-04 PROCEDURE — 3017F COLORECTAL CA SCREEN DOC REV: CPT | Performed by: INTERNAL MEDICINE

## 2019-09-04 RX ORDER — LEVOTHYROXINE SODIUM 137 UG/1
TABLET ORAL
Refills: 1 | COMMUNITY
Start: 2019-08-28

## 2019-09-04 NOTE — PROGRESS NOTES
[facility] CENTER  CANCER NETWORK OF [facility]  [facility] [address] [facility] [address]  [address], [facility] [address] [facility] [address]  Dept: [phone]  Dept Fax: [phone]  Loc: [phone]    Subjective:      Chief Complaint:  Nel De Anda is a 61 y.o. female with hematological disease. HPI: The patient is here today for follow-up regarding her history of monoclonal protein as well as leukocytosis. On today's evaluation she has no specific complaints. Her general health has been stable. Patient has no symptoms of be suggestive related to her elevated monoclonal protein of undetermined significance as well as her leukocytosis. She does have splenomegaly due to a trauma and earlier in life and therefore this is likely contributing to her elevated total white blood cell count. We will continue a pattern of surveillance. The patient denies shortness of breath, chest pain, a change in bowel habits or a change in bladder habits. ECOG performance status is level 0. PMH, SH, and FH:  I reviewed the patients medication list and allergy list as noted on the electronic medical record. The PMH, SH and FH were also reviewed as noted on the EMR. Review of Systems  Constitutional: Negative. HENT: Negative. Eyes: Negative. Respiratory: Negative. Cardiovascular: Negative. Gastrointestinal: Negative. Genitourinary: Negative. Musculoskeletal: Negative. Skin: Negative. Neurological: Negative. Hematological: Negative. Psychiatric/Behavioral: Negative.       Objective:   Physical Exam  Vitals:    09/04/19 1407 09/04/19 1408   BP: (!) 159/63 (!) 151/65   Site: Left Lower Arm Right Lower Arm   Position: Sitting Sitting   Cuff Size: Medium Adult Medium Adult   Pulse: 78    Resp: 18    Temp: 98.3 °F (36.8 °C)    TempSrc: Oral    SpO2: 97%    Weight: (!) 343 lb 9.6 oz (155.9 kg)    Height: 5' 2.99\" (1.6 m)    Vitals reviewed and

## 2019-09-05 ENCOUNTER — NURSE TRIAGE (OUTPATIENT)
Dept: OTHER | Facility: CLINIC | Age: 64
End: 2019-09-05

## 2019-09-05 NOTE — TELEPHONE ENCOUNTER
Reason for Disposition   Taking Coumadin (warfarin) or other strong blood thinner, or known bleeding disorder (e.g., thrombocytopenia)    Protocols used: VAGINAL BLEEDING - ABNORMAL-ADULT-OH    This triage is a result of a call to Mulugeta winslow Nurse. Please do not respond to the triage nurse through this encounter. Any subsequent communication should be directly with the patient. Patient states she started with vaginal bleeding 9/2/19. The bleeding was light. Now the bleeding is heavy, needing to use 6 pads per day and passing grape size clots. She states her PCP is not able to see her. She discussed this with oncologist, and he recommend she be seen. But her bleeding has now worsened. Offered to give her name of OB/GYN in her area for future appt, but recommend she proceed to [facility] ER. She will proceed to a different ER which is closer. Patient is on Xarelto.

## 2020-02-18 ENCOUNTER — HOSPITAL ENCOUNTER (OUTPATIENT)
Age: 65
Discharge: HOME | End: 2020-02-18
Payer: MEDICARE

## 2020-02-18 DIAGNOSIS — D47.2 MGUS (MONOCLONAL GAMMOPATHY OF UNKNOWN SIGNIFICANCE): ICD-10-CM

## 2020-02-18 LAB
ALBUMIN SERPL-MCNC: 3.4 G/DL (ref 3.5–5.1)
ALP BLD-CCNC: 157 U/L (ref 38–126)
ALT SERPL-CCNC: 18 U/L (ref 11–66)
ANION GAP SERPL CALCULATED.3IONS-SCNC: 15 MEQ/L (ref 8–16)
AST SERPL-CCNC: 24 U/L (ref 5–40)
BASOPHILS # BLD: 1 %
BASOPHILS ABSOLUTE: 0.2 THOU/MM3 (ref 0–0.1)
BILIRUB SERPL-MCNC: 0.6 MG/DL (ref 0.3–1.2)
BILIRUBIN DIRECT: < 0.2 MG/DL (ref 0–0.3)
BUN BLDV-MCNC: 31 MG/DL (ref 7–22)
CALCIUM SERPL-MCNC: 9.6 MG/DL (ref 8.5–10.5)
CHLORIDE BLD-SCNC: 97 MEQ/L (ref 98–111)
CO2: 20 MEQ/L (ref 23–33)
CREAT SERPL-MCNC: 1 MG/DL (ref 0.4–1.2)
EOSINOPHIL # BLD: 2.4 %
EOSINOPHILS ABSOLUTE: 0.4 THOU/MM3 (ref 0–0.4)
ERYTHROCYTE [DISTWIDTH] IN BLOOD BY AUTOMATED COUNT: 16.1 % (ref 11.5–14.5)
ERYTHROCYTE [DISTWIDTH] IN BLOOD BY AUTOMATED COUNT: 57.3 FL (ref 35–45)
GFR SERPL CREATININE-BSD FRML MDRD: 56 ML/MIN/1.73M2
GLUCOSE BLD-MCNC: 473 MG/DL (ref 70–108)
HCT VFR BLD CALC: 41.5 % (ref 37–47)
HEMOGLOBIN: 12.6 GM/DL (ref 12–16)
IMMATURE GRANS (ABS): 0.08 THOU/MM3 (ref 0–0.07)
IMMATURE GRANULOCYTES: 0.5 %
LYMPHOCYTES # BLD: 29.3 %
LYMPHOCYTES ABSOLUTE: 4.6 THOU/MM3 (ref 1–4.8)
MCH RBC QN AUTO: 29.4 PG (ref 26–33)
MCHC RBC AUTO-ENTMCNC: 30.4 GM/DL (ref 32.2–35.5)
MCV RBC AUTO: 97 FL (ref 81–99)
MONOCYTES # BLD: 5.8 %
MONOCYTES ABSOLUTE: 0.9 THOU/MM3 (ref 0.4–1.3)
NUCLEATED RED BLOOD CELLS: 0 /100 WBC
PLATELET # BLD: 362 THOU/MM3 (ref 130–400)
PMV BLD AUTO: 12.2 FL (ref 9.4–12.4)
POTASSIUM SERPL-SCNC: 4.8 MEQ/L (ref 3.5–5.2)
RBC # BLD: 4.28 MILL/MM3 (ref 4.2–5.4)
SEG NEUTROPHILS: 61 %
SEGMENTED NEUTROPHILS ABSOLUTE COUNT: 9.6 THOU/MM3 (ref 1.8–7.7)
SODIUM BLD-SCNC: 132 MEQ/L (ref 135–145)
TOTAL PROTEIN: 7.9 G/DL (ref 6.1–8)
WBC # BLD: 15.8 THOU/MM3 (ref 4.8–10.8)

## 2020-02-18 PROCEDURE — 84165 PROTEIN E-PHORESIS SERUM: CPT

## 2020-02-18 PROCEDURE — 82784 ASSAY IGA/IGD/IGG/IGM EACH: CPT

## 2020-02-18 PROCEDURE — 85025 COMPLETE CBC W/AUTO DIFF WBC: CPT

## 2020-02-18 PROCEDURE — 82248 BILIRUBIN DIRECT: CPT

## 2020-02-18 PROCEDURE — 86334 IMMUNOFIX E-PHORESIS SERUM: CPT

## 2020-02-18 PROCEDURE — 84155 ASSAY OF PROTEIN SERUM: CPT

## 2020-02-18 PROCEDURE — 80053 COMPREHEN METABOLIC PANEL: CPT

## 2020-02-18 PROCEDURE — 36415 COLL VENOUS BLD VENIPUNCTURE: CPT

## 2020-02-18 PROCEDURE — 83883 ASSAY NEPHELOMETRY NOT SPEC: CPT

## 2020-02-21 LAB — KAPPA/LAMBDA FREE LIGHT CHAINS: NORMAL

## 2020-02-22 LAB — IMMUNOFIXATION WITH QUANT: NORMAL

## 2020-03-04 ENCOUNTER — OFFICE VISIT (OUTPATIENT)
Dept: ONCOLOGY | Age: 65
End: 2020-03-04
Payer: MEDICARE

## 2020-03-04 ENCOUNTER — HOSPITAL ENCOUNTER (OUTPATIENT)
Dept: INFUSION THERAPY | Age: 65
Discharge: HOME | End: 2020-03-04
Payer: MEDICARE

## 2020-03-04 VITALS
SYSTOLIC BLOOD PRESSURE: 158 MMHG | DIASTOLIC BLOOD PRESSURE: 63 MMHG | HEIGHT: 63 IN | OXYGEN SATURATION: 97 % | WEIGHT: 293 LBS | RESPIRATION RATE: 18 BRPM | HEART RATE: 84 BPM | TEMPERATURE: 97.8 F | BODY MASS INDEX: 51.91 KG/M2

## 2020-03-04 PROCEDURE — 99211 OFF/OP EST MAY X REQ PHY/QHP: CPT

## 2020-03-04 PROCEDURE — G8484 FLU IMMUNIZE NO ADMIN: HCPCS | Performed by: INTERNAL MEDICINE

## 2020-03-04 PROCEDURE — G8417 CALC BMI ABV UP PARAM F/U: HCPCS | Performed by: INTERNAL MEDICINE

## 2020-03-04 PROCEDURE — 3017F COLORECTAL CA SCREEN DOC REV: CPT | Performed by: INTERNAL MEDICINE

## 2020-03-04 PROCEDURE — 1036F TOBACCO NON-USER: CPT | Performed by: INTERNAL MEDICINE

## 2020-03-04 PROCEDURE — 99213 OFFICE O/P EST LOW 20 MIN: CPT | Performed by: INTERNAL MEDICINE

## 2020-03-04 PROCEDURE — G8427 DOCREV CUR MEDS BY ELIG CLIN: HCPCS | Performed by: INTERNAL MEDICINE

## 2020-03-04 NOTE — PROGRESS NOTES
[facility] CANCER CENTER  CANCER NETWORK OF [facility]  [facility] [address] [facility]  [address], [facility] [address]  [address]  Dept: [phone]  Dept Fax: [phone]  Loc: [phone]    Subjective:      Chief Complaint:  Jessica Wang is a 59 y. o.female with hematological disease. HPI: Roxana To is here today for follow-up regarding her history of a monoclonal gammopathy with undetermined significance, leukocytosis, and hypertension. She also continues to be significantly overweight. The patient had laboratory work completed on February 18, 2020. Overall the patient laboratory studies have remained stable. She does have leukocytosis secondary to the fact that she is asplenic. Patient has not had fever or other signs of infection. She states that she generally feels well with no specific complaints on review of systems. The patient denies skeletal pain. She has had good bowel and bladder function. Her ECOG performance status is level 0-1. Her blood pressure remains modestly elevated. She is going to monitor her blood pressure and follow-up with her primary care provider. PMH, SH, and FH:  I reviewed the patients medication list and allergy list as noted on the electronic medical record. The PMH, SH and FH were also reviewed as noted on the EMR. Review of Systems  Constitutional: Negative. HENT: Negative. Eyes: Negative. Respiratory: Negative. Cardiovascular: Negative. Gastrointestinal: Negative. Genitourinary: Negative. Musculoskeletal: Negative. Skin: Negative. Neurological: Negative. Hematological: Negative. Psychiatric/Behavioral: Negative.       Objective:   Physical Exam  Vitals:    03/04/20 1514   BP: (!) 158/63   Site: Left Lower Arm   Position: Sitting   Cuff Size: Medium Adult   Pulse: 84   Resp: 18   Temp: 97.8 °F (36.6 °C)   TempSrc: Oral   SpO2: 97%   Weight: (!) 350 lb (158.8 kg)   Height: 5' 2.99\" (1.6 m)   Vitals reviewed and are stable. Constitutional: Obese. No acute distress. HENT: Normocephalic and atraumatic. Eyes: Pupils are equal and reactive. No scleral icterus. Neck: Overall appearance is symmetrical. No identifiable masses. Pulmonary: Effort normal. No respiratory distress. .  Neurological: Alert and oriented to person, place, and time. Judgment and thought content normal.  Skin: Skin is warm and dry. No rash. Psychiatric: Mood and affect appropriate for the clinical situation. Behavior is normal.        Data Analysis:    Hematology 2/18/2020 8/19/2019 2/13/2019   WBC 15.8 (H) 15.6 (H) 13.3 (H)   RBC 4.28 4.14 (L) 4.16 (L)   HGB 12.6 13.4 13.3   HCT 41.5 43.2 41.3   MCV 97.0 104.3 (H) 99.3 (H)   RDW       324 408 (H)     SPEP/MOON Interpretation                  M-spikes in the beta/gamma region.  The monoclonal protein   peaks collectively account for 1.12 g/dL of the total   protein in the beta and gamma regions.  This quantitation   may include complement components.  MOON pattern shows an   IgA type lambda monoclonal protein.  Poor correlation   exists between the electrophoretic quantitation of the   M-spike versus the nephelometric quantitation of the   immunoglobulin involved in the M-spike.  Loss of linearity   at higher concentrations of M-protein is a limiting factor   in electrophoretic quantitation.  Linearity throughout the   reportable range has been established with nephelometric   quantitation of immunoglobulin protein.      Assessment:   1. Monoclonal gammopathy of undetermined significance. 2.  Asplenia. 3.  Leukocytosis. 4.  Hypertension. Recommendations:   1. Monitor monoclonal protein. 2.  Monitor total WBC count and for signs of infection/fever. 3.  Monitor blood pressure and follow up with primary care provider for further surveillance and management.     Cynthia Cooper M.D.

## 2020-08-31 ENCOUNTER — HOSPITAL ENCOUNTER (OUTPATIENT)
Age: 65
Discharge: HOME | End: 2020-08-31
Payer: MEDICARE

## 2020-08-31 DIAGNOSIS — D47.2 MGUS (MONOCLONAL GAMMOPATHY OF UNKNOWN SIGNIFICANCE): ICD-10-CM

## 2020-08-31 LAB
ABSOLUTE IMMATURE GRANULOCYTE: 0.04 THOU/MM3 (ref 0–0.07)
ALBUMIN SERPL-MCNC: 3 G/DL (ref 3.5–5.1)
ALP BLD-CCNC: 180 U/L (ref 38–126)
ALT SERPL-CCNC: 28 U/L (ref 11–66)
ANION GAP SERPL CALCULATED.3IONS-SCNC: 13 MEQ/L (ref 8–16)
AST SERPL-CCNC: 35 U/L (ref 5–40)
BASINOPHIL, AUTOMATED: 1 % (ref 0–3)
BASOPHILS ABSOLUTE: 0.1 THOU/MM3 (ref 0–0.1)
BILIRUB SERPL-MCNC: 0.5 MG/DL (ref 0.3–1.2)
BILIRUBIN DIRECT: < 0.2 MG/DL (ref 0–0.3)
BUN BLDV-MCNC: 30 MG/DL (ref 7–22)
CALCIUM SERPL-MCNC: 10.3 MG/DL (ref 8.5–10.5)
CHLORIDE BLD-SCNC: 99 MEQ/L (ref 98–111)
CO2: 23 MEQ/L (ref 23–33)
CREAT SERPL-MCNC: 1 MG/DL (ref 0.4–1.2)
EOSINOPHILS ABSOLUTE: 0.3 THOU/MM3 (ref 0–0.4)
EOSINOPHILS RELATIVE PERCENT: 2 % (ref 0–4)
GFR SERPL CREATININE-BSD FRML MDRD: 56 ML/MIN/1.73M2
GLUCOSE BLD-MCNC: 367 MG/DL (ref 70–108)
HCT VFR BLD CALC: 38.3 % (ref 37–47)
HEMOGLOBIN: 11.9 GM/DL (ref 12–16)
IMMATURE GRANULOCYTES: 0 %
LYMPHOCYTES # BLD: 35 % (ref 15–47)
LYMPHOCYTES ABSOLUTE: 5.1 THOU/MM3 (ref 1–4.8)
MCH RBC QN AUTO: 29.2 PG (ref 26–33)
MCHC RBC AUTO-ENTMCNC: 31.1 GM/DL (ref 32.2–35.5)
MCV RBC AUTO: 94 FL (ref 81–99)
MONOCYTES ABSOLUTE: 0.9 THOU/MM3 (ref 0.4–1.3)
MONOCYTES: 6 % (ref 0–12)
PDW BLD-RTO: 16.5 % (ref 11.5–14.5)
PLATELET # BLD: 431 THOU/MM3 (ref 130–400)
PMV BLD AUTO: 10.7 FL (ref 9.4–12.4)
POTASSIUM SERPL-SCNC: 4.1 MEQ/L (ref 3.5–5.2)
RBC # BLD: 4.08 MILL/MM3 (ref 4.2–5.4)
SEG NEUTROPHILS: 55 % (ref 43–75)
SEGMENTED NEUTROPHILS ABSOLUTE COUNT: 7.9 THOU/MM3 (ref 1.8–7.7)
SODIUM BLD-SCNC: 135 MEQ/L (ref 135–145)
TOTAL PROTEIN: 7.7 G/DL (ref 6.1–8)
WBC # BLD: 14.3 THOU/MM3 (ref 4.8–10.8)

## 2020-08-31 PROCEDURE — 82784 ASSAY IGA/IGD/IGG/IGM EACH: CPT

## 2020-08-31 PROCEDURE — 84165 PROTEIN E-PHORESIS SERUM: CPT

## 2020-08-31 PROCEDURE — 80053 COMPREHEN METABOLIC PANEL: CPT

## 2020-08-31 PROCEDURE — 86334 IMMUNOFIX E-PHORESIS SERUM: CPT

## 2020-08-31 PROCEDURE — 84155 ASSAY OF PROTEIN SERUM: CPT

## 2020-08-31 PROCEDURE — 36415 COLL VENOUS BLD VENIPUNCTURE: CPT

## 2020-08-31 PROCEDURE — 82248 BILIRUBIN DIRECT: CPT

## 2020-08-31 PROCEDURE — 83883 ASSAY NEPHELOMETRY NOT SPEC: CPT

## 2020-08-31 PROCEDURE — 85025 COMPLETE CBC W/AUTO DIFF WBC: CPT

## 2020-09-03 LAB — KAPPA/LAMBDA FREE LIGHT CHAINS: NORMAL

## 2020-09-04 LAB — IMMUNOFIXATION WITH QUANT: NORMAL

## 2020-09-09 ENCOUNTER — HOSPITAL ENCOUNTER (OUTPATIENT)
Dept: INFUSION THERAPY | Age: 65
Discharge: HOME | End: 2020-09-09
Payer: MEDICARE

## 2020-09-09 ENCOUNTER — OFFICE VISIT (OUTPATIENT)
Dept: ONCOLOGY | Age: 65
End: 2020-09-09
Payer: MEDICARE

## 2020-09-09 VITALS
TEMPERATURE: 98.9 F | HEART RATE: 83 BPM | DIASTOLIC BLOOD PRESSURE: 62 MMHG | WEIGHT: 293 LBS | OXYGEN SATURATION: 94 % | SYSTOLIC BLOOD PRESSURE: 149 MMHG | BODY MASS INDEX: 51.91 KG/M2 | RESPIRATION RATE: 20 BRPM | HEIGHT: 63 IN

## 2020-09-09 PROCEDURE — 1036F TOBACCO NON-USER: CPT | Performed by: INTERNAL MEDICINE

## 2020-09-09 PROCEDURE — 3017F COLORECTAL CA SCREEN DOC REV: CPT | Performed by: INTERNAL MEDICINE

## 2020-09-09 PROCEDURE — 99211 OFF/OP EST MAY X REQ PHY/QHP: CPT

## 2020-09-09 PROCEDURE — G8417 CALC BMI ABV UP PARAM F/U: HCPCS | Performed by: INTERNAL MEDICINE

## 2020-09-09 PROCEDURE — 99214 OFFICE O/P EST MOD 30 MIN: CPT | Performed by: INTERNAL MEDICINE

## 2020-09-09 PROCEDURE — G8427 DOCREV CUR MEDS BY ELIG CLIN: HCPCS | Performed by: INTERNAL MEDICINE

## 2020-09-09 NOTE — PROGRESS NOTES
[facility] CANCER CENTER  CANCER NETWORK OF [facility]  [facility] [address] [facility]  [address], [facility] [address]  [address]  Dept: [phone]  Dept Fax: [phone]  Loc: [phone]    Subjective:      Chief Complaint:  Kyle Styles is a 59 y. o.female with hematological disease. HPI: Opal Agurire returns today for follow-up regarding history of monoclonal gammopathy. She continues to have multiple medical problems and is followed by multiple medical specialist.  The patient recently has had difficulty with lower extremity skin lesions secondary to poor vascular circulation. She is presently being followed by Dr. Eden Lipscomb in the wound clinic. In regards to her monoclonal gammopathy she had recent laboratory studies done that found that her monoclonal protein and light chains are relatively stable. Both her kappa and lambda light chains are elevated but her ratio is normal.  She denies skeletal pain. The patient has mild anemia but does not have evidence of blood loss. She also was noted to have mild leukocytosis but has not had any recent fever. Her platelet count is modestly elevated. She reports no abnormal bleeding or bruising. The patient's ECOG performance status is level 1-2. PMH, SH, and FH:  I reviewed the patients medication list and allergy list as noted on the electronic medical record. The PMH, SH and FH were also reviewed as noted on the EMR. Review of Systems  Constitutional: Fatigue. HENT: Negative. Eyes: Negative. Respiratory: Negative. Cardiovascular: Negative. Gastrointestinal: Negative. Genitourinary: Negative. Musculoskeletal: Negative. Skin: Left lower extremity skin lesion. Neurological: General weakness. Hematological: Negative. Psychiatric/Behavioral: Negative.        Objective:   Physical Exam  Vitals:    09/09/20 1502   BP: (!) 149/62   Site: Left Upper Arm   Position: Sitting   Cuff [address] [address] [address] of the [facility] Flory MORA at the [facility]

## 2020-09-11 PROBLEM — D75.839 THROMBOCYTOSIS: Status: ACTIVE | Noted: 2020-09-11

## 2020-09-11 PROBLEM — Q89.01 ASPLENIA: Status: ACTIVE | Noted: 2020-09-11

## 2020-09-11 PROBLEM — D64.9 MILD ANEMIA: Status: ACTIVE | Noted: 2020-09-11
